# Patient Record
Sex: MALE | Race: WHITE | URBAN - METROPOLITAN AREA
[De-identification: names, ages, dates, MRNs, and addresses within clinical notes are randomized per-mention and may not be internally consistent; named-entity substitution may affect disease eponyms.]

---

## 2024-09-16 ENCOUNTER — APPOINTMENT (RX ONLY)
Dept: URBAN - METROPOLITAN AREA CLINIC 356 | Facility: CLINIC | Age: 34
Setting detail: DERMATOLOGY
End: 2024-09-16

## 2024-09-16 PROBLEM — D23.9 OTHER BENIGN NEOPLASM OF SKIN, UNSPECIFIED: Status: ACTIVE | Noted: 2024-09-16

## 2024-09-16 PROCEDURE — ? PREVENTATIVE SKIN CANCER SCREENING

## 2024-09-16 PROCEDURE — ? ADDITIONAL NOTES

## 2024-09-16 NOTE — PROCEDURE: ADDITIONAL NOTES
Detail Level: Simple
Render Risk Assessment In Note?: no
Additional Notes: Given samples of sunscreens

## 2024-09-16 NOTE — PROCEDURE: PREVENTATIVE SKIN CANCER SCREENING
Initial Screening Text: Skin cancer screening was performed on exposed skin.  Lesions were identified that should be followed up for further evaluation.  \\n\\nWe recommend:  1)  Schedule an appointment to follow-up in the office for further exam/testing  2) Full Body Skin Exam is encouraged annually, 3) Daily use of broad spectrum SPF 30+ Sunscreen.
Initial Vs Subsequent Screenings?: Initial
Detail Level: Simple
Billing Warning: If you want to bill the  [id number] cpt codes you must manually override the bill.